# Patient Record
Sex: MALE | Race: WHITE | NOT HISPANIC OR LATINO | Employment: FULL TIME | URBAN - METROPOLITAN AREA
[De-identification: names, ages, dates, MRNs, and addresses within clinical notes are randomized per-mention and may not be internally consistent; named-entity substitution may affect disease eponyms.]

---

## 2022-09-22 ENCOUNTER — HOSPITAL ENCOUNTER (EMERGENCY)
Facility: HOSPITAL | Age: 27
Discharge: HOME | End: 2022-09-22
Attending: STUDENT IN AN ORGANIZED HEALTH CARE EDUCATION/TRAINING PROGRAM
Payer: MEDICAID

## 2022-09-22 VITALS
TEMPERATURE: 99 F | OXYGEN SATURATION: 98 % | DIASTOLIC BLOOD PRESSURE: 75 MMHG | BODY MASS INDEX: 25.03 KG/M2 | RESPIRATION RATE: 20 BRPM | WEIGHT: 169 LBS | HEIGHT: 69 IN | HEART RATE: 69 BPM | SYSTOLIC BLOOD PRESSURE: 134 MMHG

## 2022-09-22 DIAGNOSIS — S20.211A CONTUSION OF RIGHT CHEST WALL, INITIAL ENCOUNTER: Primary | ICD-10-CM

## 2022-09-22 DIAGNOSIS — T14.90XA TRAUMA: ICD-10-CM

## 2022-09-22 PROCEDURE — 99283 EMERGENCY DEPT VISIT LOW MDM: CPT | Mod: 25

## 2022-09-22 RX ORDER — PREDNISONE 10 MG/1
10 TABLET ORAL DAILY
Qty: 21 TABLET | Refills: 0 | Status: SHIPPED | OUTPATIENT
Start: 2022-09-22

## 2022-09-22 NOTE — ED NOTES
Pt states kneed in chest on tues during a kickboxing classe. Having pain to left upper chest, redness noted to area, pain worse with movement

## 2022-09-22 NOTE — ED PROVIDER NOTES
"Encounter Date: 9/22/2022       History     Chief Complaint   Patient presents with    Chest Injury     Pt states kneed in chest on tues during a kickboxing classe. Having pain to left upper chest, redness noted to area, pain worse with movement.     27-year-old  male presents to the emergency department with complaints of left midline chest pain.  Patient states he was at an exercise class when he got hit by a "flying knee".  As patient states shortly after this he did not have any pain but later that night when he tried to lay flat he had a sharp pain to the center of his chest.  He states he does occasionally feel the same pain that travels through his chest to his back.  There is no major swelling or deformity noted to the chest wall.  Patient has intact breath sounds with no acute respiratory distress.  He denies any syncope.  He denies any fevers chills.  He denies any shortness of breath.    Review of patient's allergies indicates:  No Known Allergies  No past medical history on file.  No past surgical history on file.  No family history on file.  Social History     Tobacco Use    Smoking status: Every Day     Packs/day: 1.00     Types: Cigarettes     Review of Systems   Constitutional:  Negative for appetite change, chills and fever.   HENT:  Negative for congestion, dental problem, ear pain and sore throat.    Respiratory:  Negative for cough, chest tightness, shortness of breath and wheezing.    Cardiovascular:  Positive for chest pain. Negative for palpitations and leg swelling.   Gastrointestinal:  Negative for nausea.   Genitourinary:  Negative for dysuria.   Musculoskeletal:  Positive for myalgias. Negative for back pain.   Skin:  Negative for rash.   Neurological:  Negative for dizziness, seizures, syncope, weakness and headaches.   Hematological:  Does not bruise/bleed easily.   Psychiatric/Behavioral:  Negative for agitation and confusion.      Physical Exam     Initial Vitals [09/22/22 " 1347]   BP Pulse Resp Temp SpO2   134/75 69 20 99 °F (37.2 °C) 96 %      MAP       --         Physical Exam    Nursing note and vitals reviewed.  Constitutional: Vital signs are normal. He appears well-developed and well-nourished.  Non-toxic appearance. He does not have a sickly appearance.   HENT:   Head: Normocephalic and atraumatic.   Eyes: Conjunctivae, EOM and lids are normal. Lids are everted and swept, no foreign bodies found.   Neck: Trachea normal and phonation normal. Neck supple. No thyroid mass and no thyromegaly present.   Normal range of motion.   Full passive range of motion without pain.     Cardiovascular:  Normal rate, regular rhythm, S1 normal, S2 normal, normal heart sounds, intact distal pulses and normal pulses.           Pulmonary/Chest: Breath sounds normal. No respiratory distress. He has no wheezes. He exhibits tenderness.   Patient is very tender to palpation of left center chest with even light palpation.  Patient reports it is sharp pain.   Musculoskeletal:      Cervical back: Full passive range of motion without pain, normal range of motion and neck supple.     Lymphadenopathy:     He has no cervical adenopathy.   Neurological: He is alert and oriented to person, place, and time. He has normal strength and normal reflexes.   Skin: Skin is warm, dry and intact. Capillary refill takes less than 2 seconds.   Psychiatric: He has a normal mood and affect. His speech is normal and behavior is normal. Judgment normal. Cognition and memory are normal.       ED Course   Procedures  Labs Reviewed - No data to display       Imaging Results              X-Ray Chest PA And Lateral (Preliminary result)  Result time 09/22/22 15:14:15      ED Interpretation by YOSHI Vegas (09/22/22 15:14:15, [facility] - Emergency Dept, Emergency Medicine)    Wet Read:  No obvious acute bony abnormality seen on chest x-ray.  No other cardiopulmonary changes seen.  Pending radiologist's review.                                      Medications - No data to display  Medical Decision Making:   Initial Assessment:   Chest x-ray was done prior to me assuming this could care of this patient does not appear to have any acute abnormality seen.  Patient has shortness of breath and chest pain is mainly with palpation and movement.  This appears to be a chest wall injury and could include intercostal muscles and cartilage.  Discussed symptomatic treatment of these along with steroids which patient was aware of.           ED Course as of 09/22/22 1516   Thu Sep 22, 2022   1514 Discussed chest X the x-ray reading along with symptomatic treatment which the patient did verbalize understanding of.  Patient had all questions and concerns addressed prior to discharge. [SL]      ED Course User Index  [SL] YOSHI Vegas                 Clinical Impression:   Final diagnoses:  [T14.90XA] Trauma  [S20.211A] Contusion of right chest wall, initial encounter (Primary)        ED Disposition Condition    Discharge Stable          ED Prescriptions       Medication Sig Dispense Start Date End Date Auth. Provider    predniSONE (DELTASONE) 10 MG tablet Take 1 tablet (10 mg total) by mouth once daily. Take 4 tabs x 3 days, then Take 2 tabs x 3 days, then Take 1 tab x 3 days. 21 tablet 9/22/2022 -- YOSHI Vegas          Follow-up Information       Follow up With Specialties Details Why Contact Info    Azra Hoover MD Family Medicine Call in 1 day As needed, For ER Follow Up. [address] [place] [address]  [phone]               YOSHI Vegas  09/22/22 1515       YOSHI Vegas  09/22/22 1516